# Patient Record
Sex: MALE | Race: OTHER | NOT HISPANIC OR LATINO | Employment: STUDENT | URBAN - METROPOLITAN AREA
[De-identification: names, ages, dates, MRNs, and addresses within clinical notes are randomized per-mention and may not be internally consistent; named-entity substitution may affect disease eponyms.]

---
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
## 2025-01-07 ENCOUNTER — OFFICE VISIT (OUTPATIENT)
Dept: PRIMARY CARE CLINIC | Facility: CLINIC | Age: 20
End: 2025-01-07
Payer: COMMERCIAL

## 2025-01-07 VITALS
HEART RATE: 60 BPM | DIASTOLIC BLOOD PRESSURE: 66 MMHG | BODY MASS INDEX: 23.53 KG/M2 | HEIGHT: 74 IN | WEIGHT: 183.38 LBS | SYSTOLIC BLOOD PRESSURE: 111 MMHG

## 2025-01-07 DIAGNOSIS — R68.82 DECREASED LIBIDO: ICD-10-CM

## 2025-01-07 DIAGNOSIS — F84.5 ASPERGER'S SYNDROME: Primary | ICD-10-CM

## 2025-01-07 DIAGNOSIS — Z00.00 WELLNESS EXAMINATION: ICD-10-CM

## 2025-01-07 RX ORDER — OXYBUTYNIN CHLORIDE 5 MG/1
TABLET ORAL
COMMUNITY
Start: 2024-08-06

## 2025-01-07 NOTE — PROGRESS NOTES
Subjective:      Patient ID: Nadia Appiah is a 19 y.o. male.    Chief Complaint: Annual Exam    HPI    Past Medical History:   Diagnosis Date    Anxiety     Depression        Patient here for follow up  Established with Ra Rice at [facility] psychiatry   Now on Abilify and lexapro. States his libido has decreased but states he is not sexually active     No more dizzy episodes reported. He was referred to Cardiology but never went. He was rushed to the ER last year for chest pain and cardiac reason was ruled out    On bactrim for bacteruria. States he sometimes leaks semen when he urinates     Review of Systems   Constitutional:  Negative for chills and fever.   HENT:  Negative for hearing loss.    Eyes:  Negative for blurred vision.   Respiratory:  Negative for cough, shortness of breath and wheezing.    Cardiovascular:  Negative for chest pain, palpitations and leg swelling.   Gastrointestinal:  Negative for abdominal pain, blood in stool, constipation, diarrhea, melena, nausea and vomiting.   Genitourinary:  Negative for dysuria, frequency and urgency.   Musculoskeletal:  Negative for falls.   Skin:  Negative for rash.   Neurological:  Negative for dizziness and headaches.   Endo/Heme/Allergies:  Does not bruise/bleed easily.   Psychiatric/Behavioral:  Positive for depression. The patient is nervous/anxious.      Objective:     Physical Exam  Vitals reviewed.   Constitutional:       Appearance: Normal appearance.   HENT:      Head: Normocephalic.      Mouth/Throat:      Mouth: Mucous membranes are moist.      Pharynx: Oropharynx is clear.   Eyes:      Extraocular Movements: Extraocular movements intact.      Conjunctiva/sclera: Conjunctivae normal.      Pupils: Pupils are equal, round, and reactive to light.   Cardiovascular:      Rate and Rhythm: Normal rate and regular rhythm.   Pulmonary:      Effort: Pulmonary effort is normal.      Breath sounds: Normal breath sounds.   Abdominal:      General: Bowel  "sounds are normal.   Musculoskeletal:      Right lower leg: No edema.      Left lower leg: No edema.   Skin:     General: Skin is warm.      Capillary Refill: Capillary refill takes less than 2 seconds.   Neurological:      Mental Status: He is alert and oriented to person, place, and time.   Psychiatric:         Mood and Affect: Mood normal.       /66 (BP Location: Right arm, Patient Position: Sitting)   Pulse 60   Ht 6' 2" (1.88 m)   Wt 83.2 kg (183 lb 6.4 oz)   BMI 23.55 kg/m²     Assessment:       ICD-10-CM ICD-9-CM   1. Asperger's syndrome  F84.5 299.80   2. Wellness examination  Z00.00 V70.0   3. Decreased libido  R68.82 799.81       Plan:     Medication List with Changes/Refills   Current Medications    ALBUTEROL (VENTOLIN HFA) 90 MCG/ACTUATION INHALER    Inhale 2 puffs into the lungs every 6 (six) hours as needed for Wheezing or Shortness of Breath. Rescue    ARIPIPRAZOLE (ABILIFY) 15 MG TAB    TAKE 1/2 (ONE-HALF) TABLET BY MOUTH IN THE MORNING    AZELASTINE (ASTELIN) 137 MCG (0.1 %) NASAL SPRAY    1 spray (137 mcg total) by Nasal route 2 (two) times daily.    BUSPIRONE (BUSPAR) 10 MG TABLET    Take 10 mg by mouth.    ESCITALOPRAM OXALATE (LEXAPRO) 20 MG TABLET    Take 20 mg by mouth.    FLUTICASONE PROPIONATE (FLONASE) 50 MCG/ACTUATION NASAL SPRAY    1 spray (50 mcg total) by Each Nostril route once daily.    OXYBUTYNIN (DITROPAN) 5 MG TAB    TAKE 1 TABLET BY MOUTH ONCE DAILY FOR 2 WEEKS, IF NOT WELL CONTROLLED INCREASE TO ONE TABLET TWICE DAILY. AVOID GETTING OVERHEATED   Discontinued Medications    BROMPHENIRAMINE-PSEUDOEPH-DM (BROMFED DM) 2-30-10 MG/5 ML SYRP    Take 10 mLs by mouth every 4 (four) hours as needed (cough/congestion).    CEFADROXIL (DURICEF) 500 MG CAP    Take 500 mg by mouth 2 (two) times daily.    SERTRALINE (ZOLOFT) 25 MG TABLET    Take 25 mg by mouth every evening.    VRAYLAR 3 MG CAP    Take 3 mg by mouth every evening.        1. Asperger's syndrome    2. Wellness " examination    3. Decreased libido         Continue care with psychiatrist and advised SSRIs can decrease libido. Possible retrograde ejaculation however he is not sexually active   He was given oxybutynin for urinary issues and was later advised to discontinue it      Future Appointments   Date Time Provider Department Center   1/6/2026  3:30 PM Sierra Lizarraga MD [facility] PR[facility]

## 2025-02-10 ENCOUNTER — OFFICE VISIT (OUTPATIENT)
Dept: URGENT CARE | Facility: CLINIC | Age: 20
End: 2025-02-10
Payer: COMMERCIAL

## 2025-02-10 ENCOUNTER — PATIENT MESSAGE (OUTPATIENT)
Dept: URGENT CARE | Facility: CLINIC | Age: 20
End: 2025-02-10

## 2025-02-10 VITALS
SYSTOLIC BLOOD PRESSURE: 112 MMHG | TEMPERATURE: 98 F | OXYGEN SATURATION: 96 % | BODY MASS INDEX: 23.1 KG/M2 | HEIGHT: 74 IN | DIASTOLIC BLOOD PRESSURE: 78 MMHG | HEART RATE: 65 BPM | RESPIRATION RATE: 16 BRPM | WEIGHT: 180 LBS

## 2025-02-10 DIAGNOSIS — F32.A DEPRESSION, UNSPECIFIED DEPRESSION TYPE: Primary | ICD-10-CM

## 2025-02-10 DIAGNOSIS — R63.0 LOSS OF APPETITE: ICD-10-CM

## 2025-02-10 DIAGNOSIS — F84.5 ASPERGER SYNDROME: ICD-10-CM

## 2025-02-10 DIAGNOSIS — R63.4 WEIGHT LOSS, UNINTENTIONAL: ICD-10-CM

## 2025-02-10 DIAGNOSIS — F41.9 ANXIETY: ICD-10-CM

## 2025-02-10 PROCEDURE — 99499 UNLISTED E&M SERVICE: CPT | Mod: S$GLB,,, | Performed by: NURSE PRACTITIONER

## 2025-02-10 NOTE — PROGRESS NOTES
"Subjective:      Patient ID: Nadia Appiah is a 19 y.o. male.    Vitals:  height is 6' 2" (1.88 m) and weight is 81.6 kg (180 lb). His temperature is 98 °F (36.7 °C). His blood pressure is 112/78 and his pulse is 65. His respiration is 16 and oxygen saturation is 96%.     Chief Complaint: Weight Loss, Dizziness, and Headache    Pt is [employer] student in for light headedness, headaches, trouble sleeping, loss of appetite and weight loss for the past month. He says he is only eating once a day and its usually not much, he has lost 10lbs in that month unintentionally, from 190 to 180. There is possible increased stress levels with school.       Constitution: Positive for appetite change, fatigue, unexpected weight change and generalized weakness.   Respiratory:  Negative for cough.    Gastrointestinal:  Negative for abdominal pain, nausea, vomiting and diarrhea.   Musculoskeletal:  Negative for muscle ache.   Skin:  Negative for color change, pale and rash.   Neurological:  Positive for dizziness and headaches. Negative for passing out, disorientation and altered mental status.   Psychiatric/Behavioral:  Positive for nervous/anxious and history of mental illness. Negative for altered mental status, disorientation, confusion, homicidal ideas, suicidal ideas, self-injury and depression. The patient is nervous/anxious.       Objective:     Physical Exam   Constitutional: He is oriented to person, place, and time.  Non-toxic appearance. He does not appear ill. No distress.   HENT:   Head: Normocephalic and atraumatic.   Mouth/Throat: Mucous membranes are moist.   Eyes: Conjunctivae are normal.   Cardiovascular: Normal rate and normal pulses.   Pulmonary/Chest: Effort normal.   Abdominal: Normal appearance.   Musculoskeletal: Normal range of motion.         General: Normal range of motion.   Neurological: He is alert and oriented to person, place, and time.   Skin: Skin is warm, dry and not diaphoretic.   Psychiatric: He " experiences Normal perception. He experiences No auditory hallucinations and No visual hallucinations. His speech is normal. Judgment and thought content normal. He is slowed and withdrawn. He exhibits a depressed mood. He has a flat affect.   Nursing note and vitals reviewed.      Assessment:     1. Depression, unspecified depression type    2. Anxiety    3. Loss of appetite    4. Weight loss, unintentional    5. Asperger syndrome        Plan:       Depression, unspecified depression type    Anxiety    Loss of appetite    Weight loss, unintentional    Asperger syndrome          Medical Decision Making:   Urgent Care Management:  PHQ-9 score of 19 (moderately severe)  FANY-7 score of 11 (moderate)  Pt's complaints are most consistent with physical manifestations associated with acute exacerbation of Depression/anxiety. He is currently established with and treated by psychiatry. I feel it is most appropriate for him to closely follow up with psychiatry for medical management of his symptoms.    Denies thought of harm to self/others. Strict ER precautions advised for worsening of depressive s/s and/or SI/HI.  Discussed need for close psychiatry follow up. Patient expressed verbal understanding and agreement with treatment plan.     I scheduled appointment with Ra Rice on pt's behalf for this Wednesday, 2/12/25 at 10:30. Notified pt of appointment arrangements via [employer] message.    Patient Instructions   Please follow up with your primary care provider within 2-5 days if your signs and symptoms have not resolved or worsen.     If your condition worsens or fails to improve we recommend that you receive another evaluation at the emergency room immediately or contact your primary medical clinic to discuss your concerns.   You must understand that you have received an Urgent Care treatment only and that you may be released before all of your medical problems are known or treated. You, the patient, will arrange for  follow up care as instructed.     Please follow up with Psychiatry, Ra Rice for follow up and discussion of possible medication adjustments.   I left a message with Ra's office staff to help facilitate a close follow up appointment on your behalf. I will ask them to contact you directly for appointment scheduling.  [facility] contact info:  [phone] [address] [facility], [address] [place] [address].    *Please go to your nearest ER for psych eval if you experience any worsening of your symptoms including thought of harm toward yourself and/or others.    Please make sure to increase your intake of nutrients and fluids. You can try protein shakes/drinks as an alternative.

## 2025-02-10 NOTE — PATIENT INSTRUCTIONS
Please follow up with your primary care provider within 2-5 days if your signs and symptoms have not resolved or worsen.     If your condition worsens or fails to improve we recommend that you receive another evaluation at the emergency room immediately or contact your primary medical clinic to discuss your concerns.   You must understand that you have received an Urgent Care treatment only and that you may be released before all of your medical problems are known or treated. You, the patient, will arrange for follow up care as instructed.     Please follow up with Psychiatry, Ra Rice for follow up and discussion of possible medication adjustments.   I left a message with Ra's office staff to help facilitate a close follow up appointment on your behalf. I will ask them to contact you directly for appointment scheduling.  [facility] contact info:  [phone] [address] [facility], [address] [place] [address].    *Please go to your nearest ER for psych eval if you experience any worsening of your symptoms including thought of harm toward yourself and/or others.    Please make sure to increase your intake of nutrients and fluids. You can try protein shakes/drinks as an alternative.

## 2025-06-30 ENCOUNTER — OFFICE VISIT (OUTPATIENT)
Dept: FAMILY MEDICINE | Facility: CLINIC | Age: 20
End: 2025-06-30
Payer: COMMERCIAL

## 2025-06-30 VITALS
DIASTOLIC BLOOD PRESSURE: 75 MMHG | SYSTOLIC BLOOD PRESSURE: 116 MMHG | OXYGEN SATURATION: 98 % | TEMPERATURE: 98 F | WEIGHT: 181.38 LBS | BODY MASS INDEX: 23.29 KG/M2 | HEART RATE: 69 BPM | RESPIRATION RATE: 15 BRPM

## 2025-06-30 DIAGNOSIS — F33.1 MODERATE EPISODE OF RECURRENT MAJOR DEPRESSIVE DISORDER: ICD-10-CM

## 2025-06-30 DIAGNOSIS — R51.9 FREQUENT HEADACHES: Primary | ICD-10-CM

## 2025-06-30 PROCEDURE — 1159F MED LIST DOCD IN RCRD: CPT | Mod: CPTII,,,

## 2025-06-30 PROCEDURE — 3078F DIAST BP <80 MM HG: CPT | Mod: CPTII,,,

## 2025-06-30 PROCEDURE — 1160F RVW MEDS BY RX/DR IN RCRD: CPT | Mod: CPTII,,,

## 2025-06-30 PROCEDURE — 3074F SYST BP LT 130 MM HG: CPT | Mod: CPTII,,,

## 2025-06-30 PROCEDURE — 99214 OFFICE O/P EST MOD 30 MIN: CPT | Mod: ,,,

## 2025-06-30 PROCEDURE — 3008F BODY MASS INDEX DOCD: CPT | Mod: CPTII,,,

## 2025-06-30 RX ORDER — METOPROLOL SUCCINATE 50 MG/1
50 TABLET, EXTENDED RELEASE ORAL DAILY
Qty: 90 TABLET | Refills: 3 | Status: SHIPPED | OUTPATIENT
Start: 2025-06-30 | End: 2026-06-30

## 2025-06-30 RX ORDER — SUMATRIPTAN SUCCINATE 50 MG/1
50 TABLET ORAL
Qty: 15 TABLET | Refills: 3 | Status: SHIPPED | OUTPATIENT
Start: 2025-06-30 | End: 2025-07-30

## 2025-06-30 NOTE — ASSESSMENT & PLAN NOTE
Patient would like to discuss depression medicine in the future.  He has tried Lexapro, Zoloft, and Abilify in the past.  There is also concern for an eating disorder.  PHQ-9 score of 14.

## 2025-06-30 NOTE — PROGRESS NOTES
"  [facility]  Rolanda Hopkins MD    Patient ID: [id number]     Chief Complaint: Establish Care      HPI:     Td Zuniga is a 19 y.o. male here today to establish care.    Patient has had a constant baseline headache for the last month.  For the last month he has also had a more severe headache behind eyes and frontal.  These severe headaches last about 2 hours.  He also reports nausea. He sometimes has one-sided headaches both eyes with tearing of the eye and rhinorrhea on that side.  He clenches his jaw and reports frequent jaw pain.  He has had aura about twice in the past years ago, but not with these headaches.     Patient also reports he "has not been eating" although he feels hungry.    Medical history:  Anxiety and Depression  Has tried Lexapro, Zoloft, Abilify. He was seeing a Psychiatrist in [place] and [place] but has not seen them in a while.    Surgical history:  Tonsillectomy     Social history:  Tobacco - none  Alcohol - none  Illicit substances - none  Marital status -   Sexual activity -   Occupation -       Past Medical History:   Diagnosis Date    Anxiety     Asperger's syndrome     Depression         Past Surgical History:   Procedure Laterality Date    TONSILLECTOMY          Social History     Tobacco Use    Smoking status: Never     Passive exposure: Never    Smokeless tobacco: Never   Substance and Sexual Activity    Alcohol use: Not Currently    Drug use: Never    Sexual activity: Not Currently        Current Outpatient Medications   Medication Instructions    albuterol (VENTOLIN HFA) 90 mcg/actuation inhaler 2 puffs, Inhalation, Every 6 hours PRN, Rescue    ARIPiprazole (ABILIFY) 15 MG Tab TAKE 1/2 (ONE-HALF) TABLET BY MOUTH IN THE MORNING    azelastine (ASTELIN) 137 mcg, Nasal, 2 times daily    busPIRone (BUSPAR) 10 mg    EScitalopram oxalate (LEXAPRO) 20 mg    fluticasone propionate (FLONASE) 50 mcg, Each Nostril, Daily    metoprolol succinate (TOPROL-XL) 50 mg, " Oral, Daily    oxybutynin (DITROPAN) 5 MG Tab     sumatriptan (IMITREX) 50 mg, Oral, Every 2 hours PRN       Review of patient's allergies indicates:  No Known Allergies     Patient Care Team:  Rolanda Hopkins MD as PCP - General (Family Medicine)     Subjective:     Review of Systems    12 point review of systems conducted, negative except as stated in the history of present illness. See HPI for details.    Objective:     Visit Vitals  /75 (Patient Position: Sitting)   Pulse 69   Temp 97.8 °F (36.6 °C) (Oral)   Resp 15   Wt 82.3 kg (181 lb 6.4 oz)   SpO2 98%   BMI 23.29 kg/m²       Physical Exam  Vitals and nursing note reviewed.   Constitutional:       General: He is not in acute distress.     Appearance: Normal appearance. He is not ill-appearing or diaphoretic.   HENT:      Head: Normocephalic and atraumatic.      Mouth/Throat:      Mouth: Mucous membranes are moist.      Pharynx: Oropharynx is clear.   Eyes:      Extraocular Movements: Extraocular movements intact.      Conjunctiva/sclera: Conjunctivae normal.   Cardiovascular:      Rate and Rhythm: Normal rate and regular rhythm.      Pulses: Normal pulses.      Heart sounds: No murmur heard.  Pulmonary:      Effort: Pulmonary effort is normal. No respiratory distress.      Breath sounds: Normal breath sounds. No wheezing, rhonchi or rales.   Musculoskeletal:      Right lower leg: No edema.      Left lower leg: No edema.   Skin:     General: Skin is warm and dry.   Neurological:      General: No focal deficit present.      Mental Status: He is alert and oriented to person, place, and time. Mental status is at baseline.   Psychiatric:         Mood and Affect: Mood normal.         Labs Reviewed:     Chemistry:  Lab Results   Component Value Date     09/11/2023    K 4.5 09/11/2023    BUN 16 09/11/2023    CREATININE 1.19 09/11/2023    EGFRNORACEVR 91 09/11/2023    CALCIUM 9.6 09/11/2023    ALBUMIN 4.7 09/11/2023    AST 16 09/11/2023    ALT 7 (L)  "09/11/2023    TSH 1.640 09/21/2023        No results found for: "HGBA1C", "MICROALBCREA"     Hematology:  Lab Results   Component Value Date    WBC 4.0 (L) 02/01/2024    HGB 14.1 02/01/2024    HCT 43.2 02/01/2024       Lipid Panel:  Lab Results   Component Value Date    CHOL 247 (H) 09/21/2023    HDL 60 09/21/2023    TRIG 137 09/21/2023        Urine:  No results found for: "COLORUA", "APPEARANCEUA", "SGUA", "PHUA", "PROTEINUA", "GLUCOSEUA", "KETONESUA", "BLOODUA", "NITRITESUA", "LEUKOCYTESUR", "RBCUA", "WBCUA", "BACTERIA", "SQEPUA", "HYALINECASTS", "CREATRANDUR", "PROTEINURINE", "UPROTCREA"     Assessment:       ICD-10-CM ICD-9-CM   1. Frequent headaches  R51.9 784.0   2. Moderate episode of recurrent major depressive disorder  F33.1 296.32        Plan:     1. Frequent headaches  Assessment & Plan:  Patient with 2 different headache types consistent with cluster and migraines.  Headaches have been worsening and becoming more frequent.  We will obtain MRI brain.  Trial of sumatriptan.  Due to daily headaches we will start metoprolol.    Orders:  -     metoprolol succinate (TOPROL-XL) 50 MG 24 hr tablet; Take 1 tablet (50 mg total) by mouth once daily.  Dispense: 90 tablet; Refill: 3  -     sumatriptan (IMITREX) 50 MG tablet; Take 1 tablet (50 mg total) by mouth every 2 (two) hours as needed for Migraine.  Dispense: 15 tablet; Refill: 3  -     MRI Brain W WO Contrast; Future; Expected date: 06/30/2025    2. Moderate episode of recurrent major depressive disorder  Assessment & Plan:  Patient would like to discuss depression medicine in the future.  He has tried Lexapro, Zoloft, and Abilify in the past.  There is also concern for an eating disorder.  PHQ-9 score of 14.           Follow up in about 4 weeks (around 7/28/2025). In addition to their scheduled follow up, the patient has also been instructed to follow up on as needed basis.     Future Appointments   Date Time Provider Department Center   7/28/2025  3:20 PM " Rolanda Hopkins MD [facility]   1/6/2026  3:30 PM Sierra Lizarraga MD [facility] PR[facility]G5 [facility]        Rolanda Hopkins MD

## 2025-06-30 NOTE — PATIENT INSTRUCTIONS
Call to let us know the name of the physician that did your labs.  Call your dentist for a mouth guard.

## 2025-06-30 NOTE — ASSESSMENT & PLAN NOTE
Patient with 2 different headache types consistent with cluster and migraines.  Headaches have been worsening and becoming more frequent.  We will obtain MRI brain.  Trial of sumatriptan.  Due to daily headaches we will start metoprolol.